# Patient Record
Sex: MALE | Race: BLACK OR AFRICAN AMERICAN | NOT HISPANIC OR LATINO
[De-identification: names, ages, dates, MRNs, and addresses within clinical notes are randomized per-mention and may not be internally consistent; named-entity substitution may affect disease eponyms.]

---

## 2020-12-16 ENCOUNTER — TRANSCRIPTION ENCOUNTER (OUTPATIENT)
Age: 35
End: 2020-12-16

## 2022-01-13 ENCOUNTER — APPOINTMENT (OUTPATIENT)
Dept: HUMAN REPRODUCTION | Facility: CLINIC | Age: 37
End: 2022-01-13

## 2022-02-18 ENCOUNTER — APPOINTMENT (OUTPATIENT)
Dept: HUMAN REPRODUCTION | Facility: CLINIC | Age: 37
End: 2022-02-18
Payer: COMMERCIAL

## 2022-02-18 PROCEDURE — 89322 SEMEN ANAL STRICT CRITERIA: CPT

## 2022-05-06 ENCOUNTER — APPOINTMENT (OUTPATIENT)
Dept: HUMAN REPRODUCTION | Facility: CLINIC | Age: 37
End: 2022-05-06

## 2022-05-13 ENCOUNTER — APPOINTMENT (OUTPATIENT)
Dept: HUMAN REPRODUCTION | Facility: CLINIC | Age: 37
End: 2022-05-13

## 2022-07-07 ENCOUNTER — EMERGENCY (EMERGENCY)
Facility: HOSPITAL | Age: 37
LOS: 1 days | Discharge: HOME | End: 2022-07-07
Attending: EMERGENCY MEDICINE | Admitting: EMERGENCY MEDICINE
Payer: COMMERCIAL

## 2022-07-07 VITALS
HEART RATE: 68 BPM | TEMPERATURE: 98 F | RESPIRATION RATE: 16 BRPM | SYSTOLIC BLOOD PRESSURE: 163 MMHG | WEIGHT: 205.03 LBS | DIASTOLIC BLOOD PRESSURE: 96 MMHG | OXYGEN SATURATION: 96 % | HEIGHT: 72 IN

## 2022-07-07 VITALS
RESPIRATION RATE: 18 BRPM | SYSTOLIC BLOOD PRESSURE: 147 MMHG | TEMPERATURE: 98 F | DIASTOLIC BLOOD PRESSURE: 84 MMHG | OXYGEN SATURATION: 99 % | HEART RATE: 88 BPM

## 2022-07-07 LAB
ALBUMIN SERPL ELPH-MCNC: 4 G/DL — SIGNIFICANT CHANGE UP (ref 3.3–5)
ALP SERPL-CCNC: 61 U/L — SIGNIFICANT CHANGE UP (ref 30–120)
ALT FLD-CCNC: 36 U/L DA — SIGNIFICANT CHANGE UP (ref 10–60)
ANION GAP SERPL CALC-SCNC: 6 MMOL/L — SIGNIFICANT CHANGE UP (ref 5–17)
AST SERPL-CCNC: 28 U/L — SIGNIFICANT CHANGE UP (ref 10–40)
BASOPHILS # BLD AUTO: 0.02 K/UL — SIGNIFICANT CHANGE UP (ref 0–0.2)
BASOPHILS NFR BLD AUTO: 0.6 % — SIGNIFICANT CHANGE UP (ref 0–2)
BILIRUB SERPL-MCNC: 0.4 MG/DL — SIGNIFICANT CHANGE UP (ref 0.2–1.2)
BUN SERPL-MCNC: 12 MG/DL — SIGNIFICANT CHANGE UP (ref 7–23)
CALCIUM SERPL-MCNC: 9 MG/DL — SIGNIFICANT CHANGE UP (ref 8.4–10.5)
CHLORIDE SERPL-SCNC: 102 MMOL/L — SIGNIFICANT CHANGE UP (ref 96–108)
CO2 SERPL-SCNC: 31 MMOL/L — SIGNIFICANT CHANGE UP (ref 22–31)
CREAT SERPL-MCNC: 0.89 MG/DL — SIGNIFICANT CHANGE UP (ref 0.5–1.3)
D DIMER BLD IA.RAPID-MCNC: <150 NG/ML DDU — SIGNIFICANT CHANGE UP
EGFR: 112 ML/MIN/1.73M2 — SIGNIFICANT CHANGE UP
EOSINOPHIL # BLD AUTO: 0.01 K/UL — SIGNIFICANT CHANGE UP (ref 0–0.5)
EOSINOPHIL NFR BLD AUTO: 0.3 % — SIGNIFICANT CHANGE UP (ref 0–6)
GLUCOSE SERPL-MCNC: 87 MG/DL — SIGNIFICANT CHANGE UP (ref 70–99)
HCT VFR BLD CALC: 41.2 % — SIGNIFICANT CHANGE UP (ref 39–50)
HGB BLD-MCNC: 14.5 G/DL — SIGNIFICANT CHANGE UP (ref 13–17)
HPIV3 RNA SPEC QL NAA+PROBE: DETECTED
IMM GRANULOCYTES NFR BLD AUTO: 0.3 % — SIGNIFICANT CHANGE UP (ref 0–1.5)
LYMPHOCYTES # BLD AUTO: 1.43 K/UL — SIGNIFICANT CHANGE UP (ref 1–3.3)
LYMPHOCYTES # BLD AUTO: 40.5 % — SIGNIFICANT CHANGE UP (ref 13–44)
MCHC RBC-ENTMCNC: 30.5 PG — SIGNIFICANT CHANGE UP (ref 27–34)
MCHC RBC-ENTMCNC: 35.2 GM/DL — SIGNIFICANT CHANGE UP (ref 32–36)
MCV RBC AUTO: 86.7 FL — SIGNIFICANT CHANGE UP (ref 80–100)
MONOCYTES # BLD AUTO: 0.56 K/UL — SIGNIFICANT CHANGE UP (ref 0–0.9)
MONOCYTES NFR BLD AUTO: 15.9 % — HIGH (ref 2–14)
NEUTROPHILS # BLD AUTO: 1.5 K/UL — LOW (ref 1.8–7.4)
NEUTROPHILS NFR BLD AUTO: 42.4 % — LOW (ref 43–77)
NRBC # BLD: 0 /100 WBCS — SIGNIFICANT CHANGE UP (ref 0–0)
PLATELET # BLD AUTO: 239 K/UL — SIGNIFICANT CHANGE UP (ref 150–400)
POTASSIUM SERPL-MCNC: 3.4 MMOL/L — LOW (ref 3.5–5.3)
POTASSIUM SERPL-SCNC: 3.4 MMOL/L — LOW (ref 3.5–5.3)
PROT SERPL-MCNC: 7.9 G/DL — SIGNIFICANT CHANGE UP (ref 6–8.3)
RAPID RVP RESULT: DETECTED
RBC # BLD: 4.75 M/UL — SIGNIFICANT CHANGE UP (ref 4.2–5.8)
RBC # FLD: 12.4 % — SIGNIFICANT CHANGE UP (ref 10.3–14.5)
SARS-COV-2 RNA SPEC QL NAA+PROBE: SIGNIFICANT CHANGE UP
SODIUM SERPL-SCNC: 139 MMOL/L — SIGNIFICANT CHANGE UP (ref 135–145)
TROPONIN I, HIGH SENSITIVITY RESULT: 6.1 NG/L — SIGNIFICANT CHANGE UP
WBC # BLD: 3.53 K/UL — LOW (ref 3.8–10.5)
WBC # FLD AUTO: 3.53 K/UL — LOW (ref 3.8–10.5)

## 2022-07-07 PROCEDURE — 99284 EMERGENCY DEPT VISIT MOD MDM: CPT | Mod: 25

## 2022-07-07 PROCEDURE — 85379 FIBRIN DEGRADATION QUANT: CPT

## 2022-07-07 PROCEDURE — 71046 X-RAY EXAM CHEST 2 VIEWS: CPT

## 2022-07-07 PROCEDURE — 99285 EMERGENCY DEPT VISIT HI MDM: CPT

## 2022-07-07 PROCEDURE — 84484 ASSAY OF TROPONIN QUANT: CPT

## 2022-07-07 PROCEDURE — 85025 COMPLETE CBC W/AUTO DIFF WBC: CPT

## 2022-07-07 PROCEDURE — 96361 HYDRATE IV INFUSION ADD-ON: CPT

## 2022-07-07 PROCEDURE — 96374 THER/PROPH/DIAG INJ IV PUSH: CPT

## 2022-07-07 PROCEDURE — 71046 X-RAY EXAM CHEST 2 VIEWS: CPT | Mod: 26

## 2022-07-07 PROCEDURE — 36000 PLACE NEEDLE IN VEIN: CPT | Mod: XU

## 2022-07-07 PROCEDURE — 80053 COMPREHEN METABOLIC PANEL: CPT

## 2022-07-07 PROCEDURE — 0225U NFCT DS DNA&RNA 21 SARSCOV2: CPT

## 2022-07-07 PROCEDURE — 36415 COLL VENOUS BLD VENIPUNCTURE: CPT

## 2022-07-07 RX ORDER — ALBUTEROL 90 UG/1
1 AEROSOL, METERED ORAL
Qty: 30 | Refills: 0
Start: 2022-07-07 | End: 2022-07-16

## 2022-07-07 RX ORDER — SODIUM CHLORIDE 9 MG/ML
1000 INJECTION INTRAMUSCULAR; INTRAVENOUS; SUBCUTANEOUS ONCE
Refills: 0 | Status: COMPLETED | OUTPATIENT
Start: 2022-07-07 | End: 2022-07-07

## 2022-07-07 RX ORDER — IPRATROPIUM/ALBUTEROL SULFATE 18-103MCG
3 AEROSOL WITH ADAPTER (GRAM) INHALATION ONCE
Refills: 0 | Status: DISCONTINUED | OUTPATIENT
Start: 2022-07-07 | End: 2022-07-11

## 2022-07-07 RX ADMIN — Medication 200 MILLIGRAM(S): at 15:42

## 2022-07-07 RX ADMIN — Medication 125 MILLIGRAM(S): at 16:40

## 2022-07-07 RX ADMIN — SODIUM CHLORIDE 1000 MILLILITER(S): 9 INJECTION INTRAMUSCULAR; INTRAVENOUS; SUBCUTANEOUS at 15:42

## 2022-07-07 RX ADMIN — SODIUM CHLORIDE 1000 MILLILITER(S): 9 INJECTION INTRAMUSCULAR; INTRAVENOUS; SUBCUTANEOUS at 17:13

## 2022-07-07 NOTE — ED PROVIDER NOTE - PROGRESS NOTE DETAILS
Reevaluated patient at bedside.  Patient feeling much improved. wheezing resolved. lungs CTAB. cough much improved. RVP pending. will call for positive results. supportive care discussed.  Discussed the results of all diagnostic testing in ED and copies of all reports given.   An opportunity to ask questions was given.  Discussed the importance of prompt, close medical follow-up.  Patient will return with any changes, concerns or persistent / worsening symptoms.  Understanding of all instructions verbalized.

## 2022-07-07 NOTE — ED PROVIDER NOTE - PATIENT PORTAL LINK FT
You can access the FollowMyHealth Patient Portal offered by [facility] by registering at the following website: [email or web address]. By joining [facility]’s FollowMyHealth portal, you will also be able to view your health information using other applications (apps) compatible with our system.

## 2022-07-07 NOTE — ED ADULT NURSE NOTE - OBJECTIVE STATEMENT
36 YOM A&OX3 with pmh of asthma presents to ED for chest pain and flu-like symptoms. pt states has been coughing and developed chest pain today. pt had covid swab at urgent care with negative result. pt placed no cardiac monitor and EKG completed in ED. pt rates chest pain 5/10. pt denies sob, n/v/d, headaches, dizziness, blurry vision. safety maintained.

## 2022-07-07 NOTE — ED PROVIDER NOTE - OBJECTIVE STATEMENT
36 year old male with history of asthma presents with cough, congestion, fatigue, and occ wheezing past week. reports cough for a week. occ wheezing. also mild occ diarrhea and occ sweating at night. no known fevers. occ body aches. no abd pain, nausea, or vomiting. went to urgent care, given z-pack, medrol dose pack, flonase, and refill of albuterol. no improvement. has had 4-5 neg rapid covid tests. started to have chest discomfort, shortness of breath, and increased wheezing this am 0900. no calf pain or swelling, no history of DVT or PE  PCP Erick Rueda

## 2022-07-07 NOTE — ED PROVIDER NOTE - CARE PROVIDER_API CALL
NICOLE HITCHCOCK  Internal Medicine  [address] [place] [id number]  Phone: [phone]  Fax: [phone]  Follow Up Time: 1-3 Days

## 2022-07-07 NOTE — ED ADULT TRIAGE NOTE - CHIEF COMPLAINT QUOTE
" I have pain in my chest today, I have flu like Sx x 1 week, has been at Urgent Care multiple times, tested negative 5x "

## 2022-07-07 NOTE — ED PROVIDER NOTE - CLINICAL SUMMARY MEDICAL DECISION MAKING FREE TEXT BOX
Patient complaining of shortness of breath cough and wheezing for 1 week has been to urgent care multiple times had 5 negative rapid COVID test.  Patient developed discomfort in his chest today so came to the ER for evaluation.  No fevers chills headache nausea vomiting edema calf pain travel.  Plan EKG x-ray labs DuoNeb SoluMedrol Tessalon Perles Patient complaining of shortness of breath cough and wheezing for 1 week has been to urgent care multiple times had 5 negative rapid COVID test.  Patient has had zpack, medrol dose pack and flonase. Patient developed discomfort in his chest today so came to the ER for evaluation.  No fevers chills headache nausea vomiting edema calf pain travel.  Plan EKG x-ray labs DuoNeb Solu-Medrol Tessalon Perles

## 2022-07-07 NOTE — ED ADULT NURSE NOTE - CHPI ED NUR SYMPTOMS NEG
no body aches/no chills/no diaphoresis/no edema/no fever/no headache/no hemoptysis/no shortness of breath/no wheezing

## 2022-07-07 NOTE — ED PROVIDER NOTE - NSFOLLOWUPINSTRUCTIONS_ED_ALL_ED_FT
drink plenty of fluids  albuterol, 2 puffs every 4 hours (or nebulizer)  prednisone once a day for 4 days (start tomorrow)  tessalon perles three times a day for cough  RVP pending, will call today for positive results   follow up with primary care provider       Acute Bronchitis, Adult    A comparison between normal and swollen bronchi air tubes in the lungs.   Acute bronchitis is when air tubes in the lungs (bronchi) suddenly get swollen. The condition can make it hard for you to breathe. In adults, acute bronchitis usually goes away within 2 weeks. A cough caused by bronchitis may last up to 3 weeks. Smoking, allergies, and asthma can make the condition worse.      What are the causes?    This condition is caused by:  •Cold and flu viruses. The most common cause of this condition is the virus that causes the common cold.       •Bacteria.     •Substances that irritate the lungs, including:   •Smoke from cigarettes and other types of tobacco.      •Dust and pollen.       •Fumes from chemicals, gases, or burned fuel.      •Other materials that pollute indoor or outdoor air.         •Close contact with someone who has acute bronchitis.        What increases the risk?    The following factors may make you more likely to develop this condition:  •A weak body's defense system. This is also called the immune system.       •Any condition that affects your lungs and breathing, such as asthma.        What are the signs or symptoms?    Symptoms of this condition include:  •A cough.      •Coughing up clear, yellow, or green mucus.      •Wheezing.      •Having too much mucus in your lungs (chest congestion).      •Shortness of breath.      •A fever.      •Chills.      •Body aches.      •A sore throat.        How is this treated?    Acute bronchitis may go away over time without treatment. Your doctor may recommend:  •Drinking more fluids.       •Using a device that gets medicine into your lungs (inhaler).      •Using a vaporizer or a humidifier. These are machines that add water or moisture to the air. This helps with coughing and poor breathing.      •Taking a medicine for fever.      •Taking a medicine that thins mucus and clears congestion.      •Taking a medicine that prevents or stops coughing.        Follow these instructions at home:    Activity     •Get a lot of rest.      •Return to your normal activities as told by your doctor. Ask your doctor what activities are safe for you.        Lifestyle   A comparison of three sample cups showing dark yellow, yellow, and pale yellow pee.   •Drink enough fluid to keep your pee (urine) pale yellow.      • Do not drink alcohol.     • Do not use any products that contain nicotine or tobacco, such as cigarettes, e-cigarettes, and chewing tobacco. If you need help quitting, ask your doctor. Be aware that:  •Your bronchitis will get worse if you smoke or breathe in other people's smoke (secondhand smoke).      •Your lungs will heal faster if you quit smoking.        General instructions     •Take over-the-counter and prescription medicines only as told by your doctor.      •Use an inhaler, cool mist vaporizer, or humidifier as told by your doctor.      •Rinse your mouth often with salt water. To make salt water, dissolve ½–1 tsp (3–6 g) of salt in 1 cup (237 mL) of warm water.      •Take two teaspoons of honey at bedtime. This helps lessen your coughing at night.      •Keep all follow-up visits as told by your doctor. This is important.        How is this prevented?  Washing hands with soap and water.   To lower your risk of getting this condition again:  •Wash your hands often with soap and water. If you cannot use soap and water, use hand .      •Avoid contact with people who have cold symptoms.      •Try not to touch your mouth, nose, or eyes with your hands.      •Make sure to get the flu shot every year.        Contact a doctor if:    •Your symptoms do not get better in 2 weeks.      •You vomit more than once or twice.     •You have symptoms of loss of fluid from your body (dehydration). These include:   •Dark pee.      •Dry skin or eyes.      •Increased thirst.       •Headaches.       •Confusion.      •Muscle cramps.          Get help right away if:    •You cough up blood.      •You have chest pain.      •You have very bad shortness of breath.      •You become dehydrated.      •You faint or keep feeling like you are going to faint.      •You have a very bad headache.      •Your fever or chills get worse.      These symptoms may be an emergency. Get help right away. Call your local emergency services (911 in the [place]    • Do not wait to see if the symptoms will go away.       • Do not drive yourself to the hospital.         Summary    •Acute bronchitis is when air tubes in the lungs (bronchi) suddenly get swollen. In adults, acute bronchitis usually goes away within 2 weeks.      •Take over-the-counter and prescription medicines only as told by your doctor.      •Drink enough fluid to keep your pee (urine) pale yellow.      •Contact a doctor if your symptoms do not improve after 2 weeks of treatment.      •Get help right away if you cough up blood, faint, or have chest pain or shortness of breath.      This information is not intended to replace advice given to you by your health care provider. Make sure you discuss any questions you have with your health care provider.

## 2022-07-07 NOTE — ED PROVIDER NOTE - NS ED ATTENDING STATEMENT MOD
This was a shared visit with the QUINTIN. I reviewed and verified the documentation and independently performed the documented:

## 2023-03-04 ENCOUNTER — APPOINTMENT (OUTPATIENT)
Dept: HUMAN REPRODUCTION | Facility: CLINIC | Age: 38
End: 2023-03-04
Payer: SELF-PAY

## 2023-03-04 PROCEDURE — ZZZZZ: CPT

## 2023-09-21 NOTE — ED ADULT NURSE NOTE - BREATH SOUNDS, LEFT
Hearing Loss: Care Instructions  Your Care Instructions      Hearing loss is a sudden or slow decrease in how well you hear. It can range from mild to profound. Permanent hearing loss can occur with aging, and it can happen when you are exposed long-term to loud noise. Examples include listening to loud music, riding motorcycles, or being around other loud machines. Hearing loss can affect your work and home life. It can make you feel lonely or depressed. You may feel that you have lost your independence. But hearing aids and other devices can help you hear better and feel connected to others. Follow-up care is a key part of your treatment and safety. Be sure to make and go to all appointments, and call your doctor if you are having problems. It's also a good idea to know your test results and keep a list of the medicines you take. How can you care for yourself at home? Avoid loud noises whenever possible. This helps keep your hearing from getting worse. Always wear hearing protection around loud noises. If appropriate, wear hearing aid(s) as directed. It is recommended that hearing aids are worn during all waking hours to keep your brain active and give it access to the sounds it is missing. If you are beginning your process with hearing aid(s), schedule a \"Hearing Aid Evaluation\" with an audiologist to discuss your lifestyle, features of hearing aid technology, and styles of hearing aids available. It is recommended that you contact your insurance company to determine if you have a hearing aid benefit, as this may dictate who you can see for these services. Have hearing tests as your doctor suggests. They can show whether your hearing has changed. Your hearing aid may need to be adjusted. Use other assistive devices as needed. These may include:  Telephone amplifiers and hearing aids that can connect to a television, stereo, radio, or microphone. Devices that use lights or vibrations.  These
clear

## 2023-09-27 ENCOUNTER — NON-APPOINTMENT (OUTPATIENT)
Age: 38
End: 2023-09-27

## 2023-10-18 ENCOUNTER — APPOINTMENT (OUTPATIENT)
Dept: HUMAN REPRODUCTION | Facility: CLINIC | Age: 38
End: 2023-10-18
Payer: COMMERCIAL

## 2023-10-18 PROCEDURE — ZZZZZ: CPT

## 2023-11-18 ENCOUNTER — APPOINTMENT (OUTPATIENT)
Dept: HUMAN REPRODUCTION | Facility: CLINIC | Age: 38
End: 2023-11-18
Payer: COMMERCIAL

## 2023-11-18 PROCEDURE — ZZZZZ: CPT

## 2023-11-27 ENCOUNTER — NON-APPOINTMENT (OUTPATIENT)
Age: 38
End: 2023-11-27

## 2023-12-17 ENCOUNTER — APPOINTMENT (OUTPATIENT)
Dept: HUMAN REPRODUCTION | Facility: CLINIC | Age: 38
End: 2023-12-17

## 2023-12-25 ENCOUNTER — NON-APPOINTMENT (OUTPATIENT)
Age: 38
End: 2023-12-25

## 2024-04-06 ENCOUNTER — NON-APPOINTMENT (OUTPATIENT)
Age: 39
End: 2024-04-06

## 2024-04-08 ENCOUNTER — APPOINTMENT (OUTPATIENT)
Dept: ORTHOPEDIC SURGERY | Facility: CLINIC | Age: 39
End: 2024-04-08
Payer: COMMERCIAL

## 2024-04-08 ENCOUNTER — NON-APPOINTMENT (OUTPATIENT)
Age: 39
End: 2024-04-08

## 2024-04-08 ENCOUNTER — APPOINTMENT (OUTPATIENT)
Dept: MRI IMAGING | Facility: CLINIC | Age: 39
End: 2024-04-08
Payer: COMMERCIAL

## 2024-04-08 VITALS — BODY MASS INDEX: 29.12 KG/M2 | WEIGHT: 215 LBS | HEIGHT: 72 IN

## 2024-04-08 DIAGNOSIS — Z78.9 OTHER SPECIFIED HEALTH STATUS: ICD-10-CM

## 2024-04-08 PROCEDURE — 99203 OFFICE O/P NEW LOW 30 MIN: CPT

## 2024-04-08 PROCEDURE — 73721 MRI JNT OF LWR EXTRE W/O DYE: CPT | Mod: RT

## 2024-04-08 NOTE — REASON FOR VISIT
[FreeTextEntry2] : Patient is a 38 year old male with complaints of pain R Ankle since yesterday. Patient was playing basketball and was stepped on by another player. Patient twisted and felt a pop. Pain weight bearing. X Rays were done at [facility]: reviewed: shows small posterior calcaneal spur. He was placed in splint and given crutches.

## 2024-04-08 NOTE — HISTORY OF PRESENT ILLNESS
[Gradual] : gradual [10] : 10 [Sharp] : sharp [Constant] : constant [Household chores] : household chores [Leisure] : leisure [Sleep] : sleep [Nothing helps with pain getting better] : Nothing helps with pain getting better [Full time] : Work status: full time [] : Post Surgical Visit: no [FreeTextEntry1] : R Ankle [FreeTextEntry7] : R Calf  [de-identified] : 4/7/2024 [de-identified] : [facility]  [de-identified] : [employer]

## 2024-04-08 NOTE — ASSESSMENT
[FreeTextEntry1] : clinically he ruptured his right Achilles tendon. Continue with crutches and splint. Refer to Dr Sepulveda for evaluation; AT repair. MRI right ankle is ordered. Most likely will require surgical repair.

## 2024-04-08 NOTE — PHYSICAL EXAM
[Right] : right foot and ankle [Moderate] : moderate swelling over achilles tendon [] : ambulation with crutches [FreeTextEntry8] : palpable defect Achilles tendon

## 2024-04-10 ENCOUNTER — APPOINTMENT (OUTPATIENT)
Dept: ORTHOPEDIC SURGERY | Facility: CLINIC | Age: 39
End: 2024-04-10
Payer: COMMERCIAL

## 2024-04-10 VITALS — HEIGHT: 72 IN | BODY MASS INDEX: 29.12 KG/M2 | WEIGHT: 215 LBS

## 2024-04-10 DIAGNOSIS — Z78.9 OTHER SPECIFIED HEALTH STATUS: ICD-10-CM

## 2024-04-10 DIAGNOSIS — S86.011A STRAIN OF RIGHT ACHILLES TENDON, INITIAL ENCOUNTER: ICD-10-CM

## 2024-04-10 PROCEDURE — 99214 OFFICE O/P EST MOD 30 MIN: CPT

## 2024-04-10 NOTE — ASSESSMENT
[FreeTextEntry1] : nwb cam boot rx given -- in splint currently discussed dx at length discussed surgical and non surgical options -- discussed pros/cons and expected recovery for both all questions answered and would like to proceed with achilles repair, posterior fasciotomy, possible FHL xfer  The pros, cons, risks, benefits, and alternatives have been discussed.  The risks include but are not limited to infection, bleeding, injury to small nerves and blood vessels, pain, stiffness, progression, rerupture, dvt, PE, amputation and death. All the patient's questions were answered and they would like to proceed.

## 2024-04-10 NOTE — PHYSICAL EXAM
[Right] : right foot and ankle [Mild] : mild swelling over achilles tendon [5___] : [facility] 5[unfilled]/5 [2+] : dorsalis pedis pulse: 2+ [] : Sensation present to light touch in all distributions [de-identified] : abnormal ashraf [de-identified] : ruben [TWNoteComboBox7] : dorsiflexion 5 degrees [de-identified] : plantar flexion 20 degrees

## 2024-04-10 NOTE — DATA REVIEWED
[Outside X-rays] : outside x-rays [Right] : of the right [MRI] : MRI [Ankle] : ankle [I reviewed the films/CD and additionally noted] : I reviewed the films/CD and additionally noted [FreeTextEntry1] : no acute fx -- mayito [FreeTextEntry2] : R ankle -- achilles rupture/partial avulsion

## 2024-04-10 NOTE — HISTORY OF PRESENT ILLNESS
[8] : 8 [2] : 2 [de-identified] : 04/10/2024;  felt pop in achilles playing bball 3 days ago. went to  and then saw dr thomas who sent for mri. nwb in splint. no prior ankle probs. denies dm/tob. operations [FreeTextEntry1] : rtAlton mcnally

## 2024-04-15 ENCOUNTER — OUTPATIENT (OUTPATIENT)
Dept: OUTPATIENT SERVICES | Facility: HOSPITAL | Age: 39
LOS: 1 days | End: 2024-04-15
Payer: COMMERCIAL

## 2024-04-15 VITALS
HEIGHT: 72 IN | WEIGHT: 212.97 LBS | DIASTOLIC BLOOD PRESSURE: 80 MMHG | OXYGEN SATURATION: 97 % | TEMPERATURE: 98 F | RESPIRATION RATE: 14 BRPM | HEART RATE: 59 BPM | SYSTOLIC BLOOD PRESSURE: 122 MMHG

## 2024-04-15 DIAGNOSIS — S86.011A STRAIN OF RIGHT ACHILLES TENDON, INITIAL ENCOUNTER: ICD-10-CM

## 2024-04-15 DIAGNOSIS — Z01.818 ENCOUNTER FOR OTHER PREPROCEDURAL EXAMINATION: ICD-10-CM

## 2024-04-15 DIAGNOSIS — Z98.890 OTHER SPECIFIED POSTPROCEDURAL STATES: Chronic | ICD-10-CM

## 2024-04-15 PROCEDURE — G0463: CPT

## 2024-04-15 NOTE — H&P PST ADULT - ENMT
negative Expiration Date (Optional): 08/06/2022 Render J-Code Information In Note?: yes Administered By (Optional): IRMA Treatment Number: 10 Consent: The risks of the medication were reviewed with the patient. Dose Administered (Numbers Only): 0 Hide Second Medication?: No Procedure Information: Please note that the numeric value listed in the Medication (1) and associated J-code units and Medication (2) and associated J-code units variables are j-code amounts and do not represent either the concentration or the total amount of the medications injected.  I strongly recommend selecting no to the Render J-code information in note question. This will allow your note to be more clear. If you are billing j-codes with your injection codes you need to document the total amount of the medication injected. This amount should match the j-code units. For example, if you are injecting Triamcinolone 40mg as an intramuscular injection you would select 40 for the dose field and mg for the units. This would allow you to document  with 4 units (40mg = 10mg x 4). The total volume is not used to calculate j-codes only the amount of the medication administered. Medication (1) And Associated J-Code Units: Candida Antigen Lot # (Optional):  Route: IL Total Volume Injected In Cc (Will Not Affected Billing): .2 Post-Care Instructions: I reviewed with the patient in detail post-care instructions. Patient understands to keep the injection sites clean and call the clinic if there is any redness, swelling or pain. Detail Level: None no gross abnormalities

## 2024-04-15 NOTE — H&P PST ADULT - ASSESSMENT
37 yo male is scheduled for right achilles repair posterior fasciotomy, possible tendon transfer on 4/19/2024

## 2024-04-15 NOTE — H&P PST ADULT - HISTORY OF PRESENT ILLNESS
39 yo male reports right achilles tendon rupture 4/7/2024 while playing basketball.   He states that he has pain and tenderness right heel through calg.  He is scheduled for right achilles tendon repair posterior fasciectomy, possible tendon transfer on 4/19/2024

## 2024-04-15 NOTE — H&P PST ADULT - NSICDXFAMILYHX_GEN_ALL_CORE_FT
FAMILY HISTORY:  Father  Still living? No  FH: prostate cancer, Age at diagnosis: Age Unknown    Mother  Still living? No  FHx: type 2 diabetes mellitus, Age at diagnosis: Age Unknown    Grandparent  Still living? No  Family history of breast cancer, Age at diagnosis: Age Unknown

## 2024-04-15 NOTE — H&P PST ADULT - PROBLEM SELECTOR PLAN 1
Right achilles repair posterior fasciotomy, possible tendon transfer is planned for 4/19/2024  Pre op instructions were reviewed  Best wishes offered

## 2024-04-18 ENCOUNTER — TRANSCRIPTION ENCOUNTER (OUTPATIENT)
Age: 39
End: 2024-04-18

## 2024-04-18 NOTE — ASU DISCHARGE PLAN (ADULT/PEDIATRIC) - CARE PROVIDER_API CALL
Kirby Sepulveda  Orthopaedic Surgery  [address] [place] [address]  Phone: [phone]  Fax: [phone]  Follow Up Time: 2 weeks

## 2024-04-18 NOTE — ASU DISCHARGE PLAN (ADULT/PEDIATRIC) - ASU DC SPECIAL INSTRUCTIONSFT
Non weight bearing right lower extremity, Ambulate with crutches or a walker  Elevate right foot on blankets above heart level (toes above the nose) for 1 hour 3 times a day.  Apply ice pack to right foot for 30 minutes every 3 hours daily.  Keep splint/dressing clean and dry daily.  Cover right foot in shower daily with plastic bag & tape ( or cast bag ) to keep dry.  See the Surgeon in the office in 10-14 days.  Call the Surgeon for fever, severe foot pain.

## 2024-04-19 ENCOUNTER — OUTPATIENT (OUTPATIENT)
Dept: OUTPATIENT SERVICES | Facility: HOSPITAL | Age: 39
LOS: 1 days | End: 2024-04-19
Payer: COMMERCIAL

## 2024-04-19 ENCOUNTER — APPOINTMENT (OUTPATIENT)
Dept: ORTHOPEDIC SURGERY | Facility: HOSPITAL | Age: 39
End: 2024-04-19
Payer: COMMERCIAL

## 2024-04-19 VITALS
HEART RATE: 68 BPM | SYSTOLIC BLOOD PRESSURE: 140 MMHG | RESPIRATION RATE: 16 BRPM | DIASTOLIC BLOOD PRESSURE: 84 MMHG | OXYGEN SATURATION: 99 %

## 2024-04-19 VITALS
WEIGHT: 214.07 LBS | SYSTOLIC BLOOD PRESSURE: 146 MMHG | RESPIRATION RATE: 20 BRPM | HEART RATE: 64 BPM | DIASTOLIC BLOOD PRESSURE: 83 MMHG | TEMPERATURE: 98 F | OXYGEN SATURATION: 98 % | HEIGHT: 72 IN

## 2024-04-19 DIAGNOSIS — Z98.890 OTHER SPECIFIED POSTPROCEDURAL STATES: Chronic | ICD-10-CM

## 2024-04-19 DIAGNOSIS — S86.011A STRAIN OF RIGHT ACHILLES TENDON, INITIAL ENCOUNTER: ICD-10-CM

## 2024-04-19 DIAGNOSIS — Z01.818 ENCOUNTER FOR OTHER PREPROCEDURAL EXAMINATION: ICD-10-CM

## 2024-04-19 PROCEDURE — C1713: CPT

## 2024-04-19 PROCEDURE — 27650 REPAIR ACHILLES TENDON: CPT | Mod: RT

## 2024-04-19 PROCEDURE — 99261: CPT

## 2024-04-19 PROCEDURE — 27601 DECOMPRESSION OF LOWER LEG: CPT | Mod: RT

## 2024-04-19 PROCEDURE — 27601 DECOMPRESSION OF LOWER LEG: CPT | Mod: 59,RT

## 2024-04-19 PROCEDURE — 29515 APPLICATION SHORT LEG SPLINT: CPT | Mod: 59,RT

## 2024-04-19 PROCEDURE — C9399: CPT

## 2024-04-19 DEVICE — IMP SYS MIDSUBSTANCE ACHILLES SPEEDBRIDGE: Type: IMPLANTABLE DEVICE | Site: RIGHT | Status: FUNCTIONAL

## 2024-04-19 RX ORDER — SODIUM CHLORIDE 9 MG/ML
1000 INJECTION, SOLUTION INTRAVENOUS
Refills: 0 | Status: DISCONTINUED | OUTPATIENT
Start: 2024-04-19 | End: 2024-04-19

## 2024-04-19 RX ORDER — HYDROMORPHONE HYDROCHLORIDE 2 MG/ML
0.2 INJECTION INTRAMUSCULAR; INTRAVENOUS; SUBCUTANEOUS
Refills: 0 | Status: DISCONTINUED | OUTPATIENT
Start: 2024-04-19 | End: 2024-04-19

## 2024-04-19 RX ORDER — HYDROMORPHONE HYDROCHLORIDE 2 MG/ML
0.5 INJECTION INTRAMUSCULAR; INTRAVENOUS; SUBCUTANEOUS
Refills: 0 | Status: DISCONTINUED | OUTPATIENT
Start: 2024-04-19 | End: 2024-04-19

## 2024-04-19 RX ORDER — APREPITANT 80 MG/1
40 CAPSULE ORAL ONCE
Refills: 0 | Status: COMPLETED | OUTPATIENT
Start: 2024-04-19 | End: 2024-04-19

## 2024-04-19 RX ORDER — HYDROCODONE BITARTRATE AND ACETAMINOPHEN 7.5; 325 MG/15ML; MG/15ML
1 SOLUTION ORAL
Qty: 20 | Refills: 0
Start: 2024-04-19

## 2024-04-19 RX ORDER — ASPIRIN/CALCIUM CARB/MAGNESIUM 324 MG
1 TABLET ORAL
Qty: 30 | Refills: 0
Start: 2024-04-19

## 2024-04-19 RX ORDER — OXYCODONE HYDROCHLORIDE 5 MG/1
5 TABLET ORAL ONCE
Refills: 0 | Status: DISCONTINUED | OUTPATIENT
Start: 2024-04-19 | End: 2024-04-19

## 2024-04-19 RX ORDER — CEFAZOLIN SODIUM 1 G
2000 VIAL (EA) INJECTION ONCE
Refills: 0 | Status: COMPLETED | OUTPATIENT
Start: 2024-04-19 | End: 2024-04-19

## 2024-04-19 RX ORDER — ONDANSETRON 8 MG/1
4 TABLET, FILM COATED ORAL ONCE
Refills: 0 | Status: DISCONTINUED | OUTPATIENT
Start: 2024-04-19 | End: 2024-04-19

## 2024-04-19 RX ORDER — CHLORHEXIDINE GLUCONATE 213 G/1000ML
1 SOLUTION TOPICAL ONCE
Refills: 0 | Status: COMPLETED | OUTPATIENT
Start: 2024-04-19 | End: 2024-04-19

## 2024-04-19 RX ADMIN — APREPITANT 40 MILLIGRAM(S): 80 CAPSULE ORAL at 07:26

## 2024-04-19 RX ADMIN — CHLORHEXIDINE GLUCONATE 1 APPLICATION(S): 213 SOLUTION TOPICAL at 07:27

## 2024-04-19 NOTE — ASU PREOP CHECKLIST - BP NONINVASIVE SYSTOLIC (MM HG)
146
Patient complaining of chest tightness, lightheadedness, cough and rash to extremities and back.  Patient denies any N/V/D, fevers, new products (lotions, soaps, detergents), or any other complaints at this time.

## 2024-04-19 NOTE — ASU PATIENT PROFILE, ADULT - FALL HARM RISK - UNIVERSAL INTERVENTIONS
Bed in lowest position, wheels locked, appropriate side rails in place/Call bell, personal items and telephone in reach/Instruct patient to call for assistance before getting out of bed or chair/Non-slip footwear when patient is out of bed/[place] to call system/Physically safe environment - no spills, clutter or unnecessary equipment/Purposeful Proactive Rounding/Room/bathroom lighting operational, light cord in reach

## 2024-04-19 NOTE — ASU PATIENT PROFILE, ADULT - IS PATIENT PREGNANT?
Lani Babcock is here for IV hydration due to dehydration and weakness    Reviewed and verified Advanced Directives: No: Patient declined to create/provide document at this time     Is the patient on an active anti-cancer treatment plan? No, Nursing Assessment:  A comprehensive nursing assessment was performed and the patient reports the following:    No new concerns or complaints     Is this patient status post Stem Cell Transplant? No    Vitals:  Orthostatic BP Reading:  Vitals:    04/09/24 1241   BP: 117/65   BP Location: RUE - Right upper extremity   Patient Position: Sitting   Cuff Size: Regular   Pulse: 89   Resp: 15   Temp: 97.8 °F (36.6 °C)   TempSrc: Temporal   Weight: 67.3 kg (148 lb 4.8 oz)      Weight:  Wt Readings from Last 1 Encounters:   04/09/24 67.3 kg (148 lb 4.8 oz)     Labs:  Sodium (mmol/L)   Date Value   09/26/2023 138     Potassium (mmol/L)   Date Value   09/26/2023 3.7     Chloride (mmol/L)   Date Value   09/26/2023 105     Glucose (mg/dL)   Date Value   09/26/2023 117 (H)     Calcium (mg/dL)   Date Value   09/26/2023 8.4     Carbon Dioxide (mmol/L)   Date Value   09/26/2023 26     BUN (mg/dL)   Date Value   09/26/2023 14     Creatinine (mg/dL)   Date Value   09/26/2023 0.86     Magnesium (mg/dL)   Date Value   03/01/2023 1.8       Central Line Care: See Invasive (Oncology) Lines Flowsheet and MAR for CVAD documentation as appropriate.    Post Treatment: Treatment tolerated well; no adverse reaction    Education: Patient Education: No new instructions needed    Patient Discharged: patient discharged to home per self, ambulatory    Next appointment scheduled: Friday IVF  Patient instructed to call the office with any questions or concerns.  Ramila Slater NP is supervising clinician today.    Fall risk 35  ([place] Fall Risk)    Distress Tool   NA at this time     Administrations This Visit       sodium chloride 0.9% infusion       Admin Date  04/09/2024 Action  New Bag Dose   Rate  999 mL/hr  Route  Intravenous Administered By  Tana Anne, RN                         not applicable (Male)

## 2024-05-01 ENCOUNTER — APPOINTMENT (OUTPATIENT)
Dept: ORTHOPEDIC SURGERY | Facility: CLINIC | Age: 39
End: 2024-05-01
Payer: COMMERCIAL

## 2024-05-01 VITALS — WEIGHT: 215 LBS | BODY MASS INDEX: 29.12 KG/M2 | HEIGHT: 72 IN

## 2024-05-01 PROBLEM — S86.011A STRAIN OF RIGHT ACHILLES TENDON, INITIAL ENCOUNTER: Chronic | Status: ACTIVE | Noted: 2024-04-15

## 2024-05-01 PROCEDURE — 99024 POSTOP FOLLOW-UP VISIT: CPT

## 2024-05-01 NOTE — HISTORY OF PRESENT ILLNESS
[de-identified] : 04/10/2024;  felt pop in achilles playing bball 3 days ago. went to  and then saw dr thomas who sent for mri. nwb in splint. no prior ankle probs. denies dm/tob. operations  04/19/24: R Achilles Repair/posterior fasciotomy  05/01/2024: no complaints. nwb in splint. taking asa. Patient denies fevers, chills, or drainage. [] : This patient has had an injection before: no [FreeTextEntry1] : rtAlton mcnally [de-identified] : 04/19/24 [de-identified] : 04/19/24 [de-identified] : R Achilles Repair sx

## 2024-05-01 NOTE — PHYSICAL EXAM
[Right] : right foot and ankle [5___] : [facility] 5[unfilled]/5 [2+] : dorsalis pedis pulse: 2+ [] : Sensation present to light touch in all distributions [de-identified] : rollator

## 2024-05-15 ENCOUNTER — APPOINTMENT (OUTPATIENT)
Dept: ORTHOPEDIC SURGERY | Facility: CLINIC | Age: 39
End: 2024-05-15
Payer: COMMERCIAL

## 2024-05-15 PROCEDURE — 99024 POSTOP FOLLOW-UP VISIT: CPT

## 2024-05-15 NOTE — PHYSICAL EXAM
[Right] : right foot and ankle [5___] : [facility] 5[unfilled]/5 [2+] : dorsalis pedis pulse: 2+ [] : no calf tenderness [de-identified] : rollator

## 2024-05-15 NOTE — HISTORY OF PRESENT ILLNESS
[de-identified] : 04/10/2024;  felt pop in achilles playing bball 3 days ago. went to  and then saw dr thomas who sent for mri. nwb in splint. no prior ankle probs. denies dm/tob. operations  04/19/24: R Achilles Repair/posterior fasciotomy  05/01/2024: no complaints. nwb in splint. taking asa. Patient denies fevers, chills, or drainage.  05/15/2024:  no complaints. mostly nwb in boot. taking asa. Patient denies fevers, chills, or drainage. [] : This patient has had an injection before: no [FreeTextEntry1] : rtAlton mcnally [de-identified] : 04/19/24 [de-identified] : 04/19/24 [de-identified] : R Achilles Repair sx

## 2024-05-29 ENCOUNTER — APPOINTMENT (OUTPATIENT)
Dept: ORTHOPEDIC SURGERY | Facility: CLINIC | Age: 39
End: 2024-05-29
Payer: COMMERCIAL

## 2024-05-29 VITALS — WEIGHT: 215 LBS | BODY MASS INDEX: 29.12 KG/M2 | HEIGHT: 72 IN

## 2024-05-29 PROCEDURE — 99024 POSTOP FOLLOW-UP VISIT: CPT

## 2024-05-29 NOTE — REVIEW OF SYSTEMS
-Appears to be mild.  Discussed activity modification.  Referral made to PT.  Patient to wear a cock up volar wrist splint at night while sleeping.   [Joint Pain] : joint pain [Joint Swelling] : joint swelling

## 2024-05-29 NOTE — HISTORY OF PRESENT ILLNESS
[de-identified] : 04/10/2024;  felt pop in achilles playing bball 3 days ago. went to  and then saw dr thomas who sent for mri. nwb in splint. no prior ankle probs. denies dm/tob. operations  04/19/24: R Achilles Repair/posterior fasciotomy  05/01/2024: no complaints. nwb in splint. taking asa. Patient denies fevers, chills, or drainage.  05/15/2024:  no complaints. mostly nwb in boot. taking asa. Patient denies fevers, chills, or drainage.  05/29/2024: improving. wb in boot -- going to PT. Patient denies fevers, chills, or drainage. [] : This patient has had an injection before: no [FreeTextEntry1] : rtAlton mcnally [de-identified] : 04/19/24 [de-identified] : 04/19/24 [de-identified] : R Achilles Repair sx  admission

## 2024-05-29 NOTE — PHYSICAL EXAM
[Right] : right foot and ankle [2+] : dorsalis pedis pulse: 2+ [] : no calf tenderness [5___] : plantar flexion 5[unfilled]/5 [de-identified] : rollator

## 2024-06-19 ENCOUNTER — APPOINTMENT (OUTPATIENT)
Dept: ORTHOPEDIC SURGERY | Facility: CLINIC | Age: 39
End: 2024-06-19
Payer: COMMERCIAL

## 2024-06-19 DIAGNOSIS — S86.011D STRAIN OF RIGHT ACHILLES TENDON, SUBSEQUENT ENCOUNTER: ICD-10-CM

## 2024-06-19 PROCEDURE — 99024 POSTOP FOLLOW-UP VISIT: CPT

## 2024-06-19 NOTE — HISTORY OF PRESENT ILLNESS
[de-identified] : 04/10/2024;  felt pop in achilles playing bball 3 days ago. went to  and then saw dr thomas who sent for mri. nwb in splint. no prior ankle probs. denies dm/tob. operations  04/19/24: R Achilles Repair/posterior fasciotomy  05/01/2024: no complaints. nwb in splint. taking asa. Patient denies fevers, chills, or drainage.  05/15/2024:  no complaints. mostly nwb in boot. taking asa. Patient denies fevers, chills, or drainage.  05/29/2024: improving. wb in boot -- going to PT. Patient denies fevers, chills, or drainage.  06/19/24: improving. walking in boot. going to PT. Patient denies fevers, chills, or drainage. [FreeTextEntry1] : rtAlton mcnally [] : This patient has had an injection before: no [de-identified] : 04/19/24 [de-identified] : 04/19/24 [de-identified] : R Achilles Repair sx

## 2024-06-19 NOTE — PHYSICAL EXAM
[Right] : right foot and ankle [5___] : plantar flexion 5[unfilled]/5 [2+] : dorsalis pedis pulse: 2+ [NL (40)] : plantar flexion 40 degrees [] : patient ambulates without assistive device [TWNoteComboBox7] : dorsiflexion 10 degrees

## 2024-07-17 ENCOUNTER — APPOINTMENT (OUTPATIENT)
Dept: ORTHOPEDIC SURGERY | Facility: CLINIC | Age: 39
End: 2024-07-17
Payer: COMMERCIAL

## 2024-07-17 VITALS — BODY MASS INDEX: 29.12 KG/M2 | WEIGHT: 215 LBS | HEIGHT: 72 IN

## 2024-07-17 DIAGNOSIS — S86.011D STRAIN OF RIGHT ACHILLES TENDON, SUBSEQUENT ENCOUNTER: ICD-10-CM

## 2024-07-17 PROCEDURE — 99024 POSTOP FOLLOW-UP VISIT: CPT

## 2024-08-21 ENCOUNTER — APPOINTMENT (OUTPATIENT)
Dept: ORTHOPEDIC SURGERY | Facility: CLINIC | Age: 39
End: 2024-08-21
Payer: COMMERCIAL

## 2024-08-21 DIAGNOSIS — S86.011D STRAIN OF RIGHT ACHILLES TENDON, SUBSEQUENT ENCOUNTER: ICD-10-CM

## 2024-08-21 PROCEDURE — 99213 OFFICE O/P EST LOW 20 MIN: CPT

## 2024-08-21 NOTE — PHYSICAL EXAM
[Right] : right foot and ankle [NL (40)] : plantar flexion 40 degrees [NL 30)] : inversion 30 degrees [NL (20)] : eversion 20 degrees [5___] : eversion 5[unfilled]/5 [2+] : dorsalis pedis pulse: 2+ [] : no achilles tendon tenderness [TWNoteComboBox7] : dorsiflexion 10 degrees

## 2024-08-21 NOTE — HISTORY OF PRESENT ILLNESS
[de-identified] : 04/10/2024;  felt pop in achilles playing bball 3 days ago. went to  and then saw dr thomas who sent for mri. nwb in splint. no prior ankle probs. denies dm/tob. operations  04/19/24: R Achilles Repair/posterior fasciotomy  05/01/2024: no complaints. nwb in splint. taking asa. Patient denies fevers, chills, or drainage.  05/15/2024:  no complaints. mostly nwb in boot. taking asa. Patient denies fevers, chills, or drainage.  05/29/2024: improving. wb in boot -- going to PT. Patient denies fevers, chills, or drainage.  06/19/24: improving. walking in boot. going to PT. Patient denies fevers, chills, or drainage.  07/17/2024: improving. walking in regular shoes. going to PT. Patient denies fevers, chills, or drainage.  08/21/2024:  improving.  going to PT. no new issues. Patient denies fevers, chills, or drainage. [] : This patient has had an injection before: no [FreeTextEntry1] : rtAlton mcnally [de-identified] : 04/19/24 [de-identified] : 04/19/24 [de-identified] : R Achilles Repair sx

## 2024-10-31 ENCOUNTER — APPOINTMENT (OUTPATIENT)
Dept: ORTHOPEDIC SURGERY | Facility: CLINIC | Age: 39
End: 2024-10-31
Payer: COMMERCIAL

## 2024-10-31 DIAGNOSIS — S86.011D STRAIN OF RIGHT ACHILLES TENDON, SUBSEQUENT ENCOUNTER: ICD-10-CM

## 2024-10-31 DIAGNOSIS — S92.421A DISPLACED FRACTURE OF DISTAL PHALANX OF RIGHT GREAT TOE, INITIAL ENCOUNTER FOR CLOSED FRACTURE: ICD-10-CM

## 2024-10-31 PROCEDURE — 99213 OFFICE O/P EST LOW 20 MIN: CPT

## 2024-10-31 PROCEDURE — 73630 X-RAY EXAM OF FOOT: CPT | Mod: RT

## 2025-01-11 ENCOUNTER — NON-APPOINTMENT (OUTPATIENT)
Age: 40
End: 2025-01-11

## 2025-01-29 ENCOUNTER — APPOINTMENT (OUTPATIENT)
Dept: ORTHOPEDIC SURGERY | Facility: CLINIC | Age: 40
End: 2025-01-29

## (undated) DEVICE — GLV 8 PROTEXIS (BLUE)

## (undated) DEVICE — PACK LOWER EXTREMITY

## (undated) DEVICE — POSITIONER STRAP ARMBOARD VELCRO TS-30

## (undated) DEVICE — SUT NDL REGULAR SURGEON 1/2 CIRCLE REVERSE CUTTING 0.055" X 2.953"

## (undated) DEVICE — SOL IRR POUR H2O 1500ML

## (undated) DEVICE — SUT FIBERWIRE #2 38" STRAND 1 BLUE T-5 TAPER

## (undated) DEVICE — SOL IRR POUR NS 0.9% 500ML

## (undated) DEVICE — DRSG XEROFORM 1 X 8"

## (undated) DEVICE — SPECIMEN CONTAINER 100ML

## (undated) DEVICE — CANISTER SUCTION LID GUARD 3000CC

## (undated) DEVICE — SUT MONOSOF 3-0 18" PC-12

## (undated) DEVICE — DRSG STOCKINETTE TUBULAR COTTON 2PLY 6X72"

## (undated) DEVICE — BLADE SCALPEL SAFETYLOCK #10

## (undated) DEVICE — WARMING BLANKET UPPER ADULT

## (undated) DEVICE — SUT POLYSORB 2-0 30" C-14 UNDYED

## (undated) DEVICE — SUT WIRE # 2 TIGERLOOP

## (undated) DEVICE — SUT FIBERWIRE #2 38" STRAND 1 BLUE 1 WHITE/BLACK

## (undated) DEVICE — SOLIDIFIER CANN EXPRESS 3K

## (undated) DEVICE — BLADE SCALPEL SAFETYLOCK #15

## (undated) DEVICE — TOURNIQUET ESMARK 6"

## (undated) DEVICE — SUT POLYSORB 2-0 30" V-20 UNDYED

## (undated) DEVICE — LAP PAD 18 X 18"

## (undated) DEVICE — SUT MONOSOF 3-0 18" C-14

## (undated) DEVICE — DRSG ACE BANDAGE 6"

## (undated) DEVICE — SUT ETHIBOND 2-0 30" SH

## (undated) DEVICE — SUT POLYSORB 0 36" GS-21 UNDYED

## (undated) DEVICE — SUT PDS II 0 36" CT-1

## (undated) DEVICE — WOUND IRR SURGIPHOR

## (undated) DEVICE — SUT POLYSORB 0 30" GS-10 UNDYED

## (undated) DEVICE — ELCTR STRYKER NEPTUNE SMOKE EVACUATION PENCIL (GREEN)

## (undated) DEVICE — DRAPE IOBAN 23" X 23"

## (undated) DEVICE — DRSG CURITY GAUZE SPONGE 4 X 4" 12-PLY

## (undated) DEVICE — POSITIONER FOAM HEAD CRADLE (PINK)

## (undated) DEVICE — VENODYNE/SCD SLEEVE CALF MEDIUM

## (undated) DEVICE — DRSG WEBRIL 6"